# Patient Record
Sex: MALE | Race: BLACK OR AFRICAN AMERICAN | NOT HISPANIC OR LATINO | Employment: OTHER | URBAN - METROPOLITAN AREA
[De-identification: names, ages, dates, MRNs, and addresses within clinical notes are randomized per-mention and may not be internally consistent; named-entity substitution may affect disease eponyms.]

---

## 2017-01-13 ENCOUNTER — OFFICE VISIT (OUTPATIENT)
Dept: OPHTHALMOLOGY | Facility: CLINIC | Age: 72
End: 2017-01-13
Payer: MEDICARE

## 2017-01-13 DIAGNOSIS — Z96.1 PSEUDOPHAKIA OF BOTH EYES: ICD-10-CM

## 2017-01-13 DIAGNOSIS — H40.1132 PRIMARY OPEN ANGLE GLAUCOMA OF BOTH EYES, MODERATE STAGE: Primary | ICD-10-CM

## 2017-01-13 PROCEDURE — 99999 PR PBB SHADOW E&M-EST. PATIENT-LVL II: CPT | Mod: PBBFAC,,, | Performed by: OPHTHALMOLOGY

## 2017-01-13 PROCEDURE — 99212 OFFICE O/P EST SF 10 MIN: CPT | Mod: PBBFAC,PO | Performed by: OPHTHALMOLOGY

## 2017-01-13 PROCEDURE — 92012 INTRM OPH EXAM EST PATIENT: CPT | Mod: S$PBB,,, | Performed by: OPHTHALMOLOGY

## 2017-01-13 PROCEDURE — 92133 CPTRZD OPH DX IMG PST SGM ON: CPT | Mod: PBBFAC,PO | Performed by: OPHTHALMOLOGY

## 2017-01-13 NOTE — MR AVS SNAPSHOT
[facility]hthal[facility]  [address] [facility] [address] [place] [address]  Phone: [phone]  Fax: [phone]                  Derrick Cruz Jr.   2017 8:00 AM   Office Visit    Description:  Male : 1945   Provider:  Julio Cesar Michele MD   Department:  [facility] - [facility]           Reason for Visit     Glaucoma           Diagnoses this Visit        Comments    Primary open angle glaucoma of both eyes, moderate stage    -  Primary     Pseudophakia of both eyes                To Do List           Goals (5 Years of Data)     None      Follow-Up and Disposition     Return in about 3 months (around 2017).      [facility]     [facility] On [facility] Nurse Care Line -  Assistance  Registered nurses in the [facility] Call Center provide clinical advisement, health education, appointment booking, and other advisory services.  Call for this free service at [phone].             Medications           Message regarding Medications     Verify the changes and/or additions to your medication regime listed below are the same as discussed with your clinician today.  If any of these changes or additions are incorrect, please notify your healthcare provider.        STOP taking these medications     ketorolac 0.5% (ACULAR) 0.5 % Drop Place 1 drop into the right eye 4 (four) times daily. Eyedrops to start one day before surgery    prednisoLONE acetate (PRED FORTE) 1 % DrpS Place 1 drop into the right eye 4 (four) times daily. Start one day before surgery           Verify that the below list of medications is an accurate representation of the medications you are currently taking.  If none reported, the list may be blank. If incorrect, please contact your healthcare provider. Carry this list with you in case of emergency.           Current Medications     amlodipine-benazepril (LOTREL) 10-20 mg per capsule Take 1 capsule by mouth Every morning.    bimatoprost (LUMIGAN) 0.01 % Drop Place 1 drop into both  eyes every evening.    timolol maleate 0.5% (TIMOPTIC) 0.5 % Drop Place 1 drop into both eyes 2 (two) times daily.    ARGININE-PYROGLUTAMATE (L-ARGININE-L-PYROGLUTAM, BULK,) 100 % Marti     co-enzyme Q-10 30 mg capsule Take by mouth Use as needed.    magnesium 250 mg Tab Take 1 tablet by mouth Daily.           Clinical Reference Information           Allergies as of 1/13/2017     No Known Drug Allergies      Immunizations Administered on Date of Encounter - 1/13/2017     None      Orders Placed During Today's Visit      Normal Orders This Visit    Posterior Segment OCT Optic Nerve- Both eyes       [facility] Sign-Up     Activating your [facility] account is as easy as 1-2-3!     1) Visit [email or web address], select Sign Up Now, enter this activation code and your date of birth, then select Next.  [email or web address]  Expires: 2/27/2017  8:35 AM      2) Create a username and password to use when you visit [facility] in the future and select a security question in case you lose your password and select Next.    3) Enter your e-mail address and click Sign Up!    Additional Information  If you have questions, please e-mail [email or web address] or call [phone] to talk to our [facility] staff. Remember, [facility] is NOT to be used for urgent needs. For medical emergencies, dial 911.

## 2017-01-13 NOTE — PROGRESS NOTES
SUBJECTIVE:   Derrick Cruz Jr. is a 71 y.o. male   Uncorrected distance visual acuity was 20/20 in the right eye and 20/20 in the left eye.   Chief Complaint   Patient presents with    Glaucoma     3 month GOCT IOP chk; Timolol bid ou, Lumigan QHS OU        HPI:  HPI     Glaucoma    Additional comments: 3 month GOCT IOP chk; Timolol bid ou, Lumigan QHS OU             Comments   Pt states that there has been no change since his last appt. 95% compliant   with his gtts.     1. Mod. COAG (init 22/21) Goal <17  SLT OD 10/10/12 (19-18)  2. PCIOL / I-STENT OD 6-8-16   PCIOL OS w/iStent 9/30/15  3. Old BRVO OS    Timolol bid ou  Lumigan QHS OU       Last edited by Yung Schmidt, Patient Care Assistant on 1/13/2017  8:07   AM. (History)        Assessment /Plan :  1. Primary open angle glaucoma of both eyes, moderate stage Doing well, IOP within acceptable range relative to target IOP and no evidence of progression. Continue current treatment. Reviewed importance of continued compliance with treatment and follow up.     2. Pseudophakia of both eyes doing well     Return to clinic in 3-4 months  or as needed.  With 24-2 HVF, Dilation and SDP's

## 2017-05-19 ENCOUNTER — OFFICE VISIT (OUTPATIENT)
Dept: OPHTHALMOLOGY | Facility: CLINIC | Age: 72
End: 2017-05-19
Payer: MEDICARE

## 2017-05-19 ENCOUNTER — APPOINTMENT (OUTPATIENT)
Dept: OPHTHALMOLOGY | Facility: CLINIC | Age: 72
End: 2017-05-19
Payer: MEDICARE

## 2017-05-19 DIAGNOSIS — H34.8322 BRANCH RETINAL VEIN OCCLUSION OF LEFT EYE: ICD-10-CM

## 2017-05-19 DIAGNOSIS — Z96.1 PSEUDOPHAKIA OF BOTH EYES: ICD-10-CM

## 2017-05-19 DIAGNOSIS — H40.1132 PRIMARY OPEN ANGLE GLAUCOMA OF BOTH EYES, MODERATE STAGE: Primary | ICD-10-CM

## 2017-05-19 PROCEDURE — 99212 OFFICE O/P EST SF 10 MIN: CPT | Mod: PBBFAC,PO | Performed by: OPHTHALMOLOGY

## 2017-05-19 PROCEDURE — 92250 FUNDUS PHOTOGRAPHY W/I&R: CPT | Mod: PBBFAC,PO | Performed by: OPHTHALMOLOGY

## 2017-05-19 PROCEDURE — 92014 COMPRE OPH EXAM EST PT 1/>: CPT | Mod: S$PBB,,, | Performed by: OPHTHALMOLOGY

## 2017-05-19 PROCEDURE — 99999 PR PBB SHADOW E&M-EST. PATIENT-LVL II: CPT | Mod: PBBFAC,,, | Performed by: OPHTHALMOLOGY

## 2017-05-19 PROCEDURE — 92083 EXTENDED VISUAL FIELD XM: CPT | Mod: PBBFAC,PO | Performed by: OPHTHALMOLOGY

## 2017-05-19 NOTE — MR AVS SNAPSHOT
[facility] - [facility]  [address] [place] [address]  Phone: [phone]  Fax: [phone]                  Derrick Cruz Jr.   2017 8:00 AM   Office Visit    Description:  Male : 1945   Provider:  Julio Cesar Michele MD   Department:  [facility]           Reason for Visit     Glaucoma           Diagnoses this Visit        Comments    Primary open angle glaucoma of both eyes, moderate stage    -  Primary     Pseudophakia of both eyes         Branch retinal vein occlusion of left eye                To Do List           Goals (5 Years of Data)     None      Follow-Up and Disposition     Return in about 4 months (around 2017).      [facility]     [facility] On Call Nurse Care Line -  Assistance  Unless otherwise directed by your provider, please contact [facility], our nurse care line that is available for  assistance.     Registered nurses in the [facility] On Call Center provide: appointment scheduling, clinical advisement, health education, and other advisory services.  Call: [phone] (toll free)               Medications           Message regarding Medications     Verify the changes and/or additions to your medication regime listed below are the same as discussed with your clinician today.  If any of these changes or additions are incorrect, please notify your healthcare provider.             Verify that the below list of medications is an accurate representation of the medications you are currently taking.  If none reported, the list may be blank. If incorrect, please contact your healthcare provider. Carry this list with you in case of emergency.           Current Medications     amlodipine-benazepril (LOTREL) 10-20 mg per capsule Take 1 capsule by mouth Every morning.    ARGININE-PYROGLUTAMATE (L-ARGININE-L-PYROGLUTAM, BULK,) 100 % Marti     bimatoprost (LUMIGAN) 0.01 % Drop Place 1 drop into both eyes every evening.    co-enzyme Q-10 30 mg  capsule Take by mouth Use as needed.    magnesium 250 mg Tab Take 1 tablet by mouth Daily.    timolol maleate 0.5% (TIMOPTIC) 0.5 % Drop Place 1 drop into both eyes 2 (two) times daily.           Clinical Reference Information           Allergies as of 5/19/2017     No Known Drug Allergies      Immunizations Administered on Date of Encounter - 5/19/2017     None      Orders Placed During Today's Visit      Normal Orders This Visit    Color Fundus Photography - OU - Both Eyes     Avery Visual Field - OU - Extended - Both Eyes       [facility] Sign-Up     Activating your [facility] account is as easy as 1-2-3!     1) Visit [email or web address], select Sign Up Now, enter this activation code and your date of birth, then select Next.  [email or web address]  Expires: 7/3/2017  8:55 AM      2) Create a username and password to use when you visit [facility] in the future and select a security question in case you lose your password and select Next.    3) Enter your e-mail address and click Sign Up!    Additional Information  If you have questions, please e-mail [email or web address] or call [phone] to talk to our [facility] staff. Remember, [facility] is NOT to be used for urgent needs. For medical emergencies, dial 911.         Language Assistance Services     ATTENTION: Language assistance services are available, free of charge. Please call [phone].      ATENCIÓN: Si habla español, tiene a livingston disposición servicios gratuitos de asistencia lingüística. Llame al [phone].     CHÚ Ý: N?u b?n nói Ti?ng Vi?t, có các d?ch v? h? tr? ngôn ng? mi?n phí dành cho b?n. G?i s? [phone].         [facility] complies with applicable Federal civil rights laws and does not discriminate on the basis of race, color, national origin, age, disability, or sex.

## 2017-05-19 NOTE — PROGRESS NOTES
SUBJECTIVE:   Derrick Cruz Jr. is a 71 y.o. male   Uncorrected distance visual acuity was 20/20 in the right eye and 20/20 in the left eye.   Chief Complaint   Patient presents with    Glaucoma     4 month SDP HVF chk        HPI:  HPI     Glaucoma    Additional comments: 4 month SDP HVF chk           Comments   Pt states No pain or discomfort. Pt states that his right eye does get red   every now and then. Pt states he is 95% compliant with gtts.    1. Mod. COAG (init 22/21) Goal <17  SLT OD 10/10/12 (19-18)  2. PCIOL / I-STENT OD 6-8-16   PCIOL OS w/iStent 9/30/15  3. Old BRVO OS    Timolol bid ou  Lumigan QHS OU       Last edited by Yung Schmidt, Patient Care Assistant on 5/19/2017  8:13   AM. (History)        Assessment /Plan :  1. Primary open angle glaucoma of both eyes, moderate stage Doing well, IOP within acceptable range relative to target IOP and no evidence of progression. Continue current treatment. Reviewed importance of continued compliance with treatment and follow up.     2. Pseudophakia of both eyes doing well   3. Branch retinal vein occlusion of left eye stable     Return to clinic in 4 months  or as needed.  With IOP Check and GOCT

## 2017-09-28 ENCOUNTER — OFFICE VISIT (OUTPATIENT)
Dept: OPHTHALMOLOGY | Facility: CLINIC | Age: 72
End: 2017-09-28
Payer: MEDICARE

## 2017-09-28 DIAGNOSIS — Z96.1 PSEUDOPHAKIA OF BOTH EYES: ICD-10-CM

## 2017-09-28 DIAGNOSIS — H40.1132 PRIMARY OPEN ANGLE GLAUCOMA OF BOTH EYES, MODERATE STAGE: Primary | ICD-10-CM

## 2017-09-28 PROCEDURE — 99999 PR PBB SHADOW E&M-EST. PATIENT-LVL II: CPT | Mod: PBBFAC,,, | Performed by: OPHTHALMOLOGY

## 2017-09-28 PROCEDURE — 92133 CPTRZD OPH DX IMG PST SGM ON: CPT | Mod: PBBFAC,PO | Performed by: OPHTHALMOLOGY

## 2017-09-28 PROCEDURE — 92012 INTRM OPH EXAM EST PATIENT: CPT | Mod: S$PBB,,, | Performed by: OPHTHALMOLOGY

## 2017-09-28 PROCEDURE — 99212 OFFICE O/P EST SF 10 MIN: CPT | Mod: PBBFAC,PO | Performed by: OPHTHALMOLOGY

## 2017-09-28 NOTE — PROGRESS NOTES
SUBJECTIVE:   Derrick Cruz Jr. is a 71 y.o. male   Uncorrected distance visual acuity was 20/20 in the right eye and 20/25 +1 in the left eye.   Chief Complaint   Patient presents with    Glaucoma     Timolol BID % cx and Lumigan qhs OU 90% cx        HPI:  HPI     Glaucoma    Additional comments: Timolol BID % cx and Lumigan qhs OU 90% cx           Comments   4 month glaucoma check (IOP check and GOCT)    No new eye changes since patient's last visit   No eye pain    1. Mod. COAG (init 22/21) Goal <17  SLT OD 10/10/12 (19-18)  2. PCIOL / I-STENT OD 6-8-16   PCIOL OS w/iStent 9/30/15  3. Old BRVO OS    Timolol bid ou 100% cx  Lumigan QHS OU 90 % cx       Last edited by Ivonne Payne on 9/28/2017  7:55 AM. (History)        Assessment /Plan :  1. Primary open angle glaucoma of both eyes, moderate stage Doing well, IOP within acceptable range relative to target IOP and no evidence of progression. Continue current treatment. Reviewed importance of continued compliance with treatment and follow up.     2. Pseudophakia of both eyes stable       Return to clinic in 4 months  or as needed.  With IOP Check

## 2017-12-11 DIAGNOSIS — H40.1132 PRIMARY OPEN ANGLE GLAUCOMA OF BOTH EYES, MODERATE STAGE: ICD-10-CM

## 2017-12-11 RX ORDER — BIMATOPROST 0.1 MG/ML
SOLUTION/ DROPS OPHTHALMIC
Qty: 2.5 ML | Refills: 12 | Status: SHIPPED | OUTPATIENT
Start: 2017-12-11 | End: 2019-08-16

## 2017-12-11 RX ORDER — TIMOLOL MALEATE 5 MG/ML
SOLUTION/ DROPS OPHTHALMIC
Qty: 10 ML | Refills: 12 | Status: SHIPPED | OUTPATIENT
Start: 2017-12-11 | End: 2019-01-28 | Stop reason: SDUPTHER

## 2018-01-25 ENCOUNTER — OFFICE VISIT (OUTPATIENT)
Dept: OPHTHALMOLOGY | Facility: CLINIC | Age: 73
End: 2018-01-25
Payer: MEDICARE

## 2018-01-25 DIAGNOSIS — Z96.1 PSEUDOPHAKIA OF BOTH EYES: ICD-10-CM

## 2018-01-25 DIAGNOSIS — H40.1132 PRIMARY OPEN ANGLE GLAUCOMA OF BOTH EYES, MODERATE STAGE: Primary | ICD-10-CM

## 2018-01-25 PROCEDURE — 92012 INTRM OPH EXAM EST PATIENT: CPT | Mod: S$PBB,,, | Performed by: OPHTHALMOLOGY

## 2018-01-25 PROCEDURE — 99212 OFFICE O/P EST SF 10 MIN: CPT | Mod: PBBFAC,PO | Performed by: OPHTHALMOLOGY

## 2018-01-25 PROCEDURE — 99999 PR PBB SHADOW E&M-EST. PATIENT-LVL II: CPT | Mod: PBBFAC,,, | Performed by: OPHTHALMOLOGY

## 2018-01-25 NOTE — PROGRESS NOTES
SUBJECTIVE:   Derrick Cruz Jr. is a 72 y.o. male   Uncorrected distance visual acuity was 20/20 -1 in the right eye and 20/25 -2 in the left eye.   Chief Complaint   Patient presents with    Glaucoma     4 month IOP check         HPI:  HPI     Glaucoma    Additional comments: 4 month IOP check            Comments   95% compliance with gtts    1. Mod. COAG (init 22/21) Goal <17  SLT OD 10/10/12 (19-18)  2. PCIOL / I-STENT OD 6-8-16   PCIOL OS w/iStent 9/30/15  3. Old BRVO OS    Timolol bid ou  Lumigan QHS OU       Last edited by Susan Viramontes MA on 1/25/2018  8:26 AM. (History)        Assessment /Plan :  1. Primary open angle glaucoma of both eyes, moderate stage Doing well, IOP within acceptable range relative to target IOP and no evidence of progression. Continue current treatment. Reviewed importance of continued compliance with treatment and follow up.     2. Pseudophakia of both eyes  -- Condition stable, no therapeutic change required. Monitoring routinely.             Return to clinic in 4 months  or as needed.  With 24-2 HVF, Dilation and SDP's

## 2018-05-25 ENCOUNTER — APPOINTMENT (OUTPATIENT)
Dept: OPHTHALMOLOGY | Facility: CLINIC | Age: 73
End: 2018-05-25
Payer: MEDICARE

## 2018-05-25 ENCOUNTER — OFFICE VISIT (OUTPATIENT)
Dept: OPHTHALMOLOGY | Facility: CLINIC | Age: 73
End: 2018-05-25
Payer: MEDICARE

## 2018-05-25 DIAGNOSIS — Z96.1 PSEUDOPHAKIA OF BOTH EYES: ICD-10-CM

## 2018-05-25 DIAGNOSIS — H40.1132 PRIMARY OPEN ANGLE GLAUCOMA OF BOTH EYES, MODERATE STAGE: Primary | ICD-10-CM

## 2018-05-25 DIAGNOSIS — H34.8322 STABLE BRANCH RETINAL VEIN OCCLUSION OF LEFT EYE: ICD-10-CM

## 2018-05-25 PROCEDURE — 92014 COMPRE OPH EXAM EST PT 1/>: CPT | Mod: S$PBB,,, | Performed by: OPHTHALMOLOGY

## 2018-05-25 PROCEDURE — 99999 PR PBB SHADOW E&M-EST. PATIENT-LVL II: CPT | Mod: PBBFAC,,, | Performed by: OPHTHALMOLOGY

## 2018-05-25 PROCEDURE — 99212 OFFICE O/P EST SF 10 MIN: CPT | Mod: PBBFAC,PO | Performed by: OPHTHALMOLOGY

## 2018-05-25 PROCEDURE — 92083 EXTENDED VISUAL FIELD XM: CPT | Mod: PBBFAC,PO | Performed by: OPHTHALMOLOGY

## 2018-05-25 PROCEDURE — 92250 FUNDUS PHOTOGRAPHY W/I&R: CPT | Mod: PBBFAC,PO | Performed by: OPHTHALMOLOGY

## 2018-05-25 NOTE — PROGRESS NOTES
SUBJECTIVE:   Derrick Cruz Jr. is a 72 y.o. male   Uncorrected distance visual acuity was 20/20 in the right eye and 20/25 +1 in the left eye.   Chief Complaint   Patient presents with    Glaucoma     here for 4 month dilation pt states not using lumigan bc insurance company said they dont recommend         HPI:  HPI     Glaucoma    Additional comments: here for 4 month dilation pt states not using   lumigan bc insurance company said they dont recommend            Comments   1. Mod. COAG (init 22/21) Goal <17  SLT OD 10/10/12 (19-18)  2. PCIOL / I-STENT OD 6-8-16   PCIOL OS w/iStent 9/30/15  3. Old BRVO OS    Timolol bid ou  Lumigan QHS OU       Last edited by Manisha Duque MA on 5/25/2018  8:29 AM. (History)        Assessment /Plan :  1. Primary open angle glaucoma of both eyes, moderate stage Doing well, IOP within acceptable range relative to target IOP and no evidence of progression. Continue current treatment. Reviewed importance of continued compliance with treatment and follow up.  Continue Timolol OU BID  Discontinue Lumigan for now   2. Pseudophakia of both eyes  -- Condition stable, no therapeutic change required. Monitoring routinely.     3. Stable branch retinal vein occlusion of left eye  -- Condition stable, no therapeutic change required. Monitoring routinely.       Return to clinic in 3-4 months  or as needed.  With IOP Check and GOCT

## 2018-11-16 ENCOUNTER — OFFICE VISIT (OUTPATIENT)
Dept: OPHTHALMOLOGY | Facility: CLINIC | Age: 73
End: 2018-11-16
Payer: MEDICARE

## 2018-11-16 DIAGNOSIS — Z96.1 PSEUDOPHAKIA OF BOTH EYES: ICD-10-CM

## 2018-11-16 DIAGNOSIS — H40.1132 PRIMARY OPEN ANGLE GLAUCOMA OF BOTH EYES, MODERATE STAGE: Primary | ICD-10-CM

## 2018-11-16 PROCEDURE — 92012 INTRM OPH EXAM EST PATIENT: CPT | Mod: S$PBB,,, | Performed by: OPHTHALMOLOGY

## 2018-11-16 PROCEDURE — 99999 PR PBB SHADOW E&M-EST. PATIENT-LVL II: CPT | Mod: PBBFAC,,, | Performed by: OPHTHALMOLOGY

## 2018-11-16 PROCEDURE — 92133 CPTRZD OPH DX IMG PST SGM ON: CPT | Mod: PBBFAC,PO | Performed by: OPHTHALMOLOGY

## 2018-11-16 PROCEDURE — 99212 OFFICE O/P EST SF 10 MIN: CPT | Mod: PBBFAC,PO | Performed by: OPHTHALMOLOGY

## 2018-11-16 NOTE — PROGRESS NOTES
SUBJECTIVE:   Derrick Cruz Jr. is a 73 y.o. male   Uncorrected distance visual acuity was 20/20 in the right eye and 20/25 -2 in the left eye.   Chief Complaint   Patient presents with    Glaucoma        HPI:  HPI     Pt here for 4m GOCT IOP chk. No pain or discomfort. VA stable. 95%   compliant with gtts.     1. Mod. COAG (init 22/21) Goal <17  SLT OD 10/10/12 (19-18)  2. PCIOL / I-STENT OD 6-8-16   PCIOL OS w/iStent 9/30/15  3. Old BRVO OS    Timolol bid ou  Lumigan QHS OU-not using    Last edited by Yung Schmidt, Patient Care Assistant on 11/16/2018  8:10   AM. (History)        Assessment /Plan :  1. Primary open angle glaucoma of both eyes, moderate stage Doing well, IOP within acceptable range relative to target IOP and no evidence of progression. Continue current treatment. Reviewed importance of continued compliance with treatment and follow up.     2. Pseudophakia of both eyes  -- Condition stable, no therapeutic change required. Monitoring routinely.       Continue: Timolol OU BID    Return to clinic in 3 months  or as needed.  With IOP Check

## 2019-01-28 DIAGNOSIS — H40.1132 PRIMARY OPEN ANGLE GLAUCOMA OF BOTH EYES, MODERATE STAGE: ICD-10-CM

## 2019-01-29 RX ORDER — TIMOLOL MALEATE 5 MG/ML
SOLUTION/ DROPS OPHTHALMIC
Qty: 10 ML | Refills: 12 | Status: SHIPPED | OUTPATIENT
Start: 2019-01-29 | End: 2020-01-30

## 2019-02-22 ENCOUNTER — OFFICE VISIT (OUTPATIENT)
Dept: OPHTHALMOLOGY | Facility: CLINIC | Age: 74
End: 2019-02-22
Payer: MEDICARE

## 2019-02-22 DIAGNOSIS — H40.1132 PRIMARY OPEN ANGLE GLAUCOMA OF BOTH EYES, MODERATE STAGE: Primary | ICD-10-CM

## 2019-02-22 DIAGNOSIS — Z96.1 PSEUDOPHAKIA OF BOTH EYES: ICD-10-CM

## 2019-02-22 PROCEDURE — 92012 PR EYE EXAM, EST PATIENT,INTERMED: ICD-10-PCS | Mod: S$PBB,,, | Performed by: OPHTHALMOLOGY

## 2019-02-22 PROCEDURE — 99212 OFFICE O/P EST SF 10 MIN: CPT | Mod: PBBFAC,PN | Performed by: OPHTHALMOLOGY

## 2019-02-22 PROCEDURE — 92012 INTRM OPH EXAM EST PATIENT: CPT | Mod: S$PBB,,, | Performed by: OPHTHALMOLOGY

## 2019-02-22 PROCEDURE — 99999 PR PBB SHADOW E&M-EST. PATIENT-LVL II: CPT | Mod: PBBFAC,,, | Performed by: OPHTHALMOLOGY

## 2019-02-22 PROCEDURE — 99999 PR PBB SHADOW E&M-EST. PATIENT-LVL II: ICD-10-PCS | Mod: PBBFAC,,, | Performed by: OPHTHALMOLOGY

## 2019-02-22 NOTE — PROGRESS NOTES
SUBJECTIVE:   Derrick Cruz Jr. is a 73 y.o. male   Uncorrected distance visual acuity was 20/20 in the right eye and 20/20 -2 in the left eye.   Chief Complaint   Patient presents with    Glaucoma        HPI:  HPI     Pt here for 3m IOP chk. No pain or discomfort. VA stable. 100% compliant   with gtts.     1. Mod. COAG (init 22/21) Goal <17  SLT OD 10/10/12 (19-18)  Lumigan ( too expensive )  2. PCIOL / I-STENT OD 6-8-16   PCIOL OS w/iStent 9/30/15  3. Old BRVO OS    Timolol bid ou    Last edited by Yung Schmidt, Patient Care Assistant on 2/22/2019  8:47   AM. (History)        Assessment /Plan :  1. Primary open angle glaucoma of both eyes, moderate stage Doing well, IOP within acceptable range relative to target IOP and no evidence of progression. Continue current treatment. Reviewed importance of continued compliance with treatment and follow up.     2. Pseudophakia of both eyes  -- Condition stable, no therapeutic change required. Monitoring routinely.       Return to clinic in 3 months  or as needed.  With Dilation, HVF 24-2 and SDP

## 2019-05-13 ENCOUNTER — OFFICE VISIT (OUTPATIENT)
Dept: OPHTHALMOLOGY | Facility: CLINIC | Age: 74
End: 2019-05-13
Payer: MEDICARE

## 2019-05-13 DIAGNOSIS — Z96.1 PSEUDOPHAKIA OF BOTH EYES: ICD-10-CM

## 2019-05-13 DIAGNOSIS — H40.1132 PRIMARY OPEN ANGLE GLAUCOMA OF BOTH EYES, MODERATE STAGE: Primary | ICD-10-CM

## 2019-05-13 DIAGNOSIS — H34.8322 STABLE BRANCH RETINAL VEIN OCCLUSION OF LEFT EYE: ICD-10-CM

## 2019-05-13 PROCEDURE — 92250 FUNDUS PHOTOGRAPHY W/I&R: CPT | Mod: PBBFAC,PN | Performed by: OPHTHALMOLOGY

## 2019-05-13 PROCEDURE — 92083 HUMPHREY VISUAL FIELD - OU - BOTH EYES: ICD-10-PCS | Mod: 26,S$PBB,, | Performed by: OPHTHALMOLOGY

## 2019-05-13 PROCEDURE — 99999 PR PBB SHADOW E&M-EST. PATIENT-LVL II: ICD-10-PCS | Mod: PBBFAC,,, | Performed by: OPHTHALMOLOGY

## 2019-05-13 PROCEDURE — 92014 COMPRE OPH EXAM EST PT 1/>: CPT | Mod: S$PBB,,, | Performed by: OPHTHALMOLOGY

## 2019-05-13 PROCEDURE — 99212 OFFICE O/P EST SF 10 MIN: CPT | Mod: PBBFAC,25 | Performed by: OPHTHALMOLOGY

## 2019-05-13 PROCEDURE — 92083 EXTENDED VISUAL FIELD XM: CPT | Mod: PBBFAC,PN | Performed by: OPHTHALMOLOGY

## 2019-05-13 PROCEDURE — 99999 PR PBB SHADOW E&M-EST. PATIENT-LVL II: CPT | Mod: PBBFAC,,, | Performed by: OPHTHALMOLOGY

## 2019-05-13 PROCEDURE — 92250 COLOR FUNDUS PHOTOGRAPHY - OU - BOTH EYES: ICD-10-PCS | Mod: 26,S$PBB,, | Performed by: OPHTHALMOLOGY

## 2019-05-13 PROCEDURE — 92014 PR EYE EXAM, EST PATIENT,COMPREHESV: ICD-10-PCS | Mod: S$PBB,,, | Performed by: OPHTHALMOLOGY

## 2019-05-13 NOTE — PROGRESS NOTES
SUBJECTIVE:   Derrick Cruz Jr. is a 73 y.o. male   Uncorrected distance visual acuity was 20/20 in the right eye and 20/20 in the left eye.   Chief Complaint   Patient presents with    Glaucoma        HPI:  HPI     Pt here for 3m  HVF SDP chk. No pain or discomfort. VA stable. 100%   compliant with gtts.     1. Mod. COAG (init 22/21) Goal <17  SLT OD 10/10/12 (19-18)  Lumigan ( too expensive )  2. PCIOL / I-STENT OD 6-8-16   PCIOL OS w/iStent 9/30/15  3. Old BRVO OS    Timolol bid ou    Last edited by Yung Schmidt, Patient Care Assistant on 5/13/2019 10:09   AM. (History)        Assessment /Plan :  1. Primary open angle glaucoma of both eyes, moderate stage Doing well, IOP within acceptable range relative to target IOP and no evidence of progression. Continue current treatment. Reviewed importance of continued compliance with treatment and follow up.     2. Pseudophakia of both eyes  -- Condition stable, no therapeutic change required. Monitoring routinely.     3. Stable branch retinal vein occlusion of left eye        Return to clinic in 3 months  or as needed.  With IOP Check and GOCT

## 2019-08-16 ENCOUNTER — OFFICE VISIT (OUTPATIENT)
Dept: OPHTHALMOLOGY | Facility: CLINIC | Age: 74
End: 2019-08-16
Payer: MEDICARE

## 2019-08-16 DIAGNOSIS — Z96.1 PSEUDOPHAKIA OF BOTH EYES: ICD-10-CM

## 2019-08-16 DIAGNOSIS — H40.1132 PRIMARY OPEN ANGLE GLAUCOMA OF BOTH EYES, MODERATE STAGE: Primary | ICD-10-CM

## 2019-08-16 PROCEDURE — 92133 POSTERIOR SEGMENT OCT OPTIC NERVE(OCULAR COHERENCE TOMOGRAPHY) - OU - BOTH EYES: ICD-10-PCS | Mod: 26,S$PBB,, | Performed by: OPHTHALMOLOGY

## 2019-08-16 PROCEDURE — 92012 PR EYE EXAM, EST PATIENT,INTERMED: ICD-10-PCS | Mod: S$PBB,,, | Performed by: OPHTHALMOLOGY

## 2019-08-16 PROCEDURE — 92133 CPTRZD OPH DX IMG PST SGM ON: CPT | Mod: PBBFAC | Performed by: OPHTHALMOLOGY

## 2019-08-16 PROCEDURE — 99999 PR PBB SHADOW E&M-EST. PATIENT-LVL II: CPT | Mod: PBBFAC,,, | Performed by: OPHTHALMOLOGY

## 2019-08-16 PROCEDURE — 99999 PR PBB SHADOW E&M-EST. PATIENT-LVL II: ICD-10-PCS | Mod: PBBFAC,,, | Performed by: OPHTHALMOLOGY

## 2019-08-16 PROCEDURE — 99212 OFFICE O/P EST SF 10 MIN: CPT | Mod: PBBFAC,25 | Performed by: OPHTHALMOLOGY

## 2019-08-16 PROCEDURE — 92012 INTRM OPH EXAM EST PATIENT: CPT | Mod: S$PBB,,, | Performed by: OPHTHALMOLOGY

## 2019-08-16 NOTE — PROGRESS NOTES
SUBJECTIVE:   Derrick Cruz Jr. is a 73 y.o. male   Uncorrected distance visual acuity was 20/20 in the right eye and 20/20 in the left eye.   Chief Complaint   Patient presents with    Glaucoma     3 month IOP check        HPI:  HPI     Glaucoma      Additional comments: 3 month IOP check              Comments     3 month IOP check with GOCT  No changes noted in VA  No pain or discomfort  100% compliant with Timolol BID    1. Mod. COAG (init 22/21) Goal <17  SLT OD 10/10/12 (19-18)  Lumigan ( too expensive )  2. PCIOL / I-STENT OD 6-8-16   PCIOL OS w/iStent 9/30/15  3. Old BRVO OS    Timolol bid ou          Last edited by Мария Guillen on 8/16/2019  8:22 AM. (History)        Assessment /Plan :  1. Primary open angle glaucoma of both eyes, moderate stage Doing well, IOP within acceptable range relative to target IOP and no evidence of progression. Continue current treatment. Reviewed importance of continued compliance with treatment and follow up.     2. Pseudophakia of both eyes  -- Condition stable, no therapeutic change required. Monitoring routinely.       Return to clinic in 3 months  or as needed.  With IOP Check

## 2019-11-15 ENCOUNTER — OFFICE VISIT (OUTPATIENT)
Dept: OPHTHALMOLOGY | Facility: CLINIC | Age: 74
End: 2019-11-15
Payer: MEDICARE

## 2019-11-15 DIAGNOSIS — Z96.1 PSEUDOPHAKIA OF BOTH EYES: ICD-10-CM

## 2019-11-15 DIAGNOSIS — H40.1132 PRIMARY OPEN ANGLE GLAUCOMA OF BOTH EYES, MODERATE STAGE: Primary | ICD-10-CM

## 2019-11-15 PROCEDURE — 92012 PR EYE EXAM, EST PATIENT,INTERMED: ICD-10-PCS | Mod: S$PBB,,, | Performed by: OPHTHALMOLOGY

## 2019-11-15 PROCEDURE — 99999 PR PBB SHADOW E&M-EST. PATIENT-LVL II: ICD-10-PCS | Mod: PBBFAC,,, | Performed by: OPHTHALMOLOGY

## 2019-11-15 PROCEDURE — 99212 OFFICE O/P EST SF 10 MIN: CPT | Mod: PBBFAC | Performed by: OPHTHALMOLOGY

## 2019-11-15 PROCEDURE — 99999 PR PBB SHADOW E&M-EST. PATIENT-LVL II: CPT | Mod: PBBFAC,,, | Performed by: OPHTHALMOLOGY

## 2019-11-15 PROCEDURE — 92012 INTRM OPH EXAM EST PATIENT: CPT | Mod: S$PBB,,, | Performed by: OPHTHALMOLOGY

## 2019-11-15 NOTE — PROGRESS NOTES
SUBJECTIVE:   Derrikc Cruz Jr. is a 74 y.o. male   Uncorrected distance visual acuity was 20/20 in the right eye and 20/20 in the left eye.   Chief Complaint   Patient presents with    Glaucoma        HPI:  HPI     Here for iop check no changes per pt    1. Mod. COAG (init 22/21) Goal <17  SLT OD 10/10/12 (19-18)  Lumigan ( too expensive )  2. PCIOL / I-STENT OD 6-8-16   PCIOL OS w/iStent 9/30/15  3. Old BRVO OS    Timolol bid ou    Last edited by Manisha Duque MA on 11/15/2019  8:26 AM. (History)        Assessment /Plan :  1. Primary open angle glaucoma of both eyes, moderate stage Doing well, IOP within acceptable range relative to target IOP and no evidence of progression. Continue current treatment. Reviewed importance of continued compliance with treatment and follow up.     2. Pseudophakia of both eyes  -- Condition stable, no therapeutic change required. Monitoring routinely.     Return to clinic in 3 months  or as needed.  With IOP Check and GOCT

## 2020-01-30 DIAGNOSIS — H40.1132 PRIMARY OPEN ANGLE GLAUCOMA OF BOTH EYES, MODERATE STAGE: ICD-10-CM

## 2020-01-30 RX ORDER — TIMOLOL MALEATE 5 MG/ML
SOLUTION/ DROPS OPHTHALMIC
Qty: 10 ML | Refills: 12 | Status: SHIPPED | OUTPATIENT
Start: 2020-01-30 | End: 2021-02-19

## 2020-03-02 ENCOUNTER — OFFICE VISIT (OUTPATIENT)
Dept: OPHTHALMOLOGY | Facility: CLINIC | Age: 75
End: 2020-03-02
Payer: MEDICARE

## 2020-03-02 DIAGNOSIS — Z96.1 PSEUDOPHAKIA OF BOTH EYES: ICD-10-CM

## 2020-03-02 DIAGNOSIS — H34.8322 STABLE BRANCH RETINAL VEIN OCCLUSION OF LEFT EYE: ICD-10-CM

## 2020-03-02 DIAGNOSIS — H40.1132 PRIMARY OPEN ANGLE GLAUCOMA OF BOTH EYES, MODERATE STAGE: Primary | ICD-10-CM

## 2020-03-02 PROCEDURE — 92012 PR EYE EXAM, EST PATIENT,INTERMED: ICD-10-PCS | Mod: S$PBB,,, | Performed by: OPHTHALMOLOGY

## 2020-03-02 PROCEDURE — 99212 OFFICE O/P EST SF 10 MIN: CPT | Mod: PBBFAC | Performed by: OPHTHALMOLOGY

## 2020-03-02 PROCEDURE — 92012 INTRM OPH EXAM EST PATIENT: CPT | Mod: S$PBB,,, | Performed by: OPHTHALMOLOGY

## 2020-03-02 PROCEDURE — 92133 CPTRZD OPH DX IMG PST SGM ON: CPT | Mod: PBBFAC | Performed by: OPHTHALMOLOGY

## 2020-03-02 PROCEDURE — 99999 PR PBB SHADOW E&M-EST. PATIENT-LVL II: ICD-10-PCS | Mod: PBBFAC,,, | Performed by: OPHTHALMOLOGY

## 2020-03-02 PROCEDURE — 92133 POSTERIOR SEGMENT OCT OPTIC NERVE(OCULAR COHERENCE TOMOGRAPHY) - OU - BOTH EYES: ICD-10-PCS | Mod: 26,S$PBB,, | Performed by: OPHTHALMOLOGY

## 2020-03-02 PROCEDURE — 99999 PR PBB SHADOW E&M-EST. PATIENT-LVL II: CPT | Mod: PBBFAC,,, | Performed by: OPHTHALMOLOGY

## 2020-03-02 NOTE — PROGRESS NOTES
SUBJECTIVE  Derrick Cruz Jr. is 74 y.o. male  Uncorrected distance visual acuity was 20/20 in the right eye and 20/20 in the left eye.   Chief Complaint   Patient presents with    Glaucoma          HPI     3 months glaucoma check (IOP check and GOCT)    No eye pain and no new eye changes since patient's last visit    1. Mod. COAG (init 22/21) Goal <17  SLT OD 10/10/12 (19-18)  Lumigan ( too expensive )  2. PCIOL / I-STENT OD 6-8-16   PCIOL OS w/iStent 9/30/15  3. Old BRVO OS    Timolol bid ou    Last edited by Ivonne Payne on 3/2/2020  7:57 AM. (History)         Assessment /Plan :  1. Primary open angle glaucoma of both eyes, moderate stage Doing well, IOP within acceptable range relative to target IOP and no evidence of progression. Continue current treatment. Reviewed importance of continued compliance with treatment and follow up.     2. Pseudophakia of both eyes  -- Condition stable, no therapeutic change required. Monitoring routinely.     3. Stable branch retinal vein occlusion of left eye    Return to clinic in 3 months  or as needed.  With Dilation, HVF 24-2 and SDP

## 2020-12-17 ENCOUNTER — OFFICE VISIT (OUTPATIENT)
Dept: OPHTHALMOLOGY | Facility: CLINIC | Age: 75
End: 2020-12-17
Payer: MEDICARE

## 2020-12-17 ENCOUNTER — APPOINTMENT (OUTPATIENT)
Dept: OPHTHALMOLOGY | Facility: CLINIC | Age: 75
End: 2020-12-17
Payer: MEDICARE

## 2020-12-17 DIAGNOSIS — Z96.1 PSEUDOPHAKIA OF BOTH EYES: ICD-10-CM

## 2020-12-17 DIAGNOSIS — H34.8322 STABLE BRANCH RETINAL VEIN OCCLUSION OF LEFT EYE: ICD-10-CM

## 2020-12-17 DIAGNOSIS — H40.1132 PRIMARY OPEN ANGLE GLAUCOMA OF BOTH EYES, MODERATE STAGE: Primary | ICD-10-CM

## 2020-12-17 PROCEDURE — 92014 PR EYE EXAM, EST PATIENT,COMPREHESV: ICD-10-PCS | Mod: S$PBB,,, | Performed by: OPHTHALMOLOGY

## 2020-12-17 PROCEDURE — 99212 OFFICE O/P EST SF 10 MIN: CPT | Mod: PBBFAC,25 | Performed by: OPHTHALMOLOGY

## 2020-12-17 PROCEDURE — 92133 CPTRZD OPH DX IMG PST SGM ON: CPT | Mod: PBBFAC | Performed by: OPHTHALMOLOGY

## 2020-12-17 PROCEDURE — 99999 PR PBB SHADOW E&M-EST. PATIENT-LVL II: CPT | Mod: PBBFAC,,, | Performed by: OPHTHALMOLOGY

## 2020-12-17 PROCEDURE — 92083 HUMPHREY VISUAL FIELD - OU - BOTH EYES: ICD-10-PCS | Mod: 26,S$PBB,, | Performed by: OPHTHALMOLOGY

## 2020-12-17 PROCEDURE — 92083 EXTENDED VISUAL FIELD XM: CPT | Mod: PBBFAC | Performed by: OPHTHALMOLOGY

## 2020-12-17 PROCEDURE — 99999 PR PBB SHADOW E&M-EST. PATIENT-LVL II: ICD-10-PCS | Mod: PBBFAC,,, | Performed by: OPHTHALMOLOGY

## 2020-12-17 PROCEDURE — 92014 COMPRE OPH EXAM EST PT 1/>: CPT | Mod: S$PBB,,, | Performed by: OPHTHALMOLOGY

## 2020-12-17 PROCEDURE — 92133 POSTERIOR SEGMENT OCT OPTIC NERVE(OCULAR COHERENCE TOMOGRAPHY) - OU - BOTH EYES: ICD-10-PCS | Mod: 26,S$PBB,, | Performed by: OPHTHALMOLOGY

## 2020-12-17 NOTE — PROGRESS NOTES
SUBJECTIVE  Derrick Cruz Jr. is 75 y.o. male  Uncorrected distance visual acuity was 20/20 in the right eye and 20/20 in the left eye.   Chief Complaint   Patient presents with    Glaucoma     3 months for dilation, hvf 24-2, sdp          HPI     Glaucoma      Additional comments: 3 months for dilation, hvf 24-2, sdp              Comments     Patient states no visual complaints and no ocular pain/discomfort. 100%   drop compliance     1. Mod. COAG (init 22/21) Goal <17  SLT OD 10/10/12 (19-18)  Lumigan ( too expensive )  2. PCIOL / I-STENT OD 6-8-16   PCIOL OS w/iStent 9/30/15  3. Old BRVO OS    Timolol bid ou          Last edited by Carmen Graff on 12/17/2020  1:36 PM. (History)         Assessment /Plan :  1. Primary open angle glaucoma of both eyes, moderate stage Doing well, IOP within acceptable range relative to target IOP and no evidence of progression. Continue current treatment. Reviewed importance of continued compliance with treatment and follow up.     2. Pseudophakia of both eyes -stable    3. Stable branch retinal vein occlusion of left eye      Return to clinic in 4 months  or as needed.  With IOP Check

## 2021-04-16 ENCOUNTER — OFFICE VISIT (OUTPATIENT)
Dept: OPHTHALMOLOGY | Facility: CLINIC | Age: 76
End: 2021-04-16
Payer: MEDICARE

## 2021-04-16 DIAGNOSIS — Z96.1 PSEUDOPHAKIA OF BOTH EYES: ICD-10-CM

## 2021-04-16 DIAGNOSIS — H40.1132 PRIMARY OPEN ANGLE GLAUCOMA OF BOTH EYES, MODERATE STAGE: Primary | ICD-10-CM

## 2021-04-16 DIAGNOSIS — H43.811 POSTERIOR VITREOUS DETACHMENT OF RIGHT EYE: ICD-10-CM

## 2021-04-16 DIAGNOSIS — H34.8322 STABLE BRANCH RETINAL VEIN OCCLUSION OF LEFT EYE: ICD-10-CM

## 2021-04-16 PROCEDURE — 92250 FUNDUS PHOTOGRAPHY W/I&R: CPT | Mod: PBBFAC | Performed by: OPHTHALMOLOGY

## 2021-04-16 PROCEDURE — 99212 OFFICE O/P EST SF 10 MIN: CPT | Mod: PBBFAC | Performed by: OPHTHALMOLOGY

## 2021-04-16 PROCEDURE — 92250 COLOR FUNDUS PHOTOGRAPHY - OU - BOTH EYES: ICD-10-PCS | Mod: 26,S$PBB,, | Performed by: OPHTHALMOLOGY

## 2021-04-16 PROCEDURE — 99214 OFFICE O/P EST MOD 30 MIN: CPT | Mod: S$PBB,,, | Performed by: OPHTHALMOLOGY

## 2021-04-16 PROCEDURE — 99999 PR PBB SHADOW E&M-EST. PATIENT-LVL II: ICD-10-PCS | Mod: PBBFAC,,, | Performed by: OPHTHALMOLOGY

## 2021-04-16 PROCEDURE — 99999 PR PBB SHADOW E&M-EST. PATIENT-LVL II: CPT | Mod: PBBFAC,,, | Performed by: OPHTHALMOLOGY

## 2021-04-16 PROCEDURE — 99214 PR OFFICE/OUTPT VISIT, EST, LEVL IV, 30-39 MIN: ICD-10-PCS | Mod: S$PBB,,, | Performed by: OPHTHALMOLOGY

## 2021-07-23 ENCOUNTER — OFFICE VISIT (OUTPATIENT)
Dept: OPHTHALMOLOGY | Facility: CLINIC | Age: 76
End: 2021-07-23
Payer: MEDICARE

## 2021-07-23 DIAGNOSIS — H34.8322 STABLE BRANCH RETINAL VEIN OCCLUSION OF LEFT EYE: ICD-10-CM

## 2021-07-23 DIAGNOSIS — Z96.1 PSEUDOPHAKIA OF BOTH EYES: ICD-10-CM

## 2021-07-23 DIAGNOSIS — H40.1132 PRIMARY OPEN ANGLE GLAUCOMA OF BOTH EYES, MODERATE STAGE: Primary | ICD-10-CM

## 2021-07-23 PROCEDURE — 99999 PR PBB SHADOW E&M-EST. PATIENT-LVL II: CPT | Mod: PBBFAC,,, | Performed by: OPHTHALMOLOGY

## 2021-07-23 PROCEDURE — 92133 CPTRZD OPH DX IMG PST SGM ON: CPT | Mod: PBBFAC | Performed by: OPHTHALMOLOGY

## 2021-07-23 PROCEDURE — 99212 OFFICE O/P EST SF 10 MIN: CPT | Mod: PBBFAC | Performed by: OPHTHALMOLOGY

## 2021-07-23 PROCEDURE — 99999 PR PBB SHADOW E&M-EST. PATIENT-LVL II: ICD-10-PCS | Mod: PBBFAC,,, | Performed by: OPHTHALMOLOGY

## 2021-07-23 PROCEDURE — 99214 PR OFFICE/OUTPT VISIT, EST, LEVL IV, 30-39 MIN: ICD-10-PCS | Mod: S$PBB,,, | Performed by: OPHTHALMOLOGY

## 2021-07-23 PROCEDURE — 92133 POSTERIOR SEGMENT OCT OPTIC NERVE(OCULAR COHERENCE TOMOGRAPHY) - OU - BOTH EYES: ICD-10-PCS | Mod: 26,S$PBB,, | Performed by: OPHTHALMOLOGY

## 2021-07-23 PROCEDURE — 99214 OFFICE O/P EST MOD 30 MIN: CPT | Mod: S$PBB,,, | Performed by: OPHTHALMOLOGY

## 2021-07-23 RX ORDER — PERMETHRIN 50 MG/G
1 CREAM TOPICAL
COMMUNITY
Start: 2021-06-25

## 2021-07-23 RX ORDER — TRIAMCINOLONE ACETONIDE 1 MG/G
OINTMENT TOPICAL 2 TIMES DAILY
COMMUNITY
Start: 2021-06-21

## 2021-10-29 ENCOUNTER — OFFICE VISIT (OUTPATIENT)
Dept: OPHTHALMOLOGY | Facility: CLINIC | Age: 76
End: 2021-10-29
Payer: MEDICARE

## 2021-10-29 DIAGNOSIS — H40.1132 PRIMARY OPEN ANGLE GLAUCOMA OF BOTH EYES, MODERATE STAGE: Primary | ICD-10-CM

## 2021-10-29 DIAGNOSIS — H34.8322 STABLE BRANCH RETINAL VEIN OCCLUSION OF LEFT EYE: ICD-10-CM

## 2021-10-29 DIAGNOSIS — Z96.1 PSEUDOPHAKIA OF BOTH EYES: ICD-10-CM

## 2021-10-29 PROCEDURE — 99214 OFFICE O/P EST MOD 30 MIN: CPT | Mod: S$PBB,,, | Performed by: OPHTHALMOLOGY

## 2021-10-29 PROCEDURE — 99212 OFFICE O/P EST SF 10 MIN: CPT | Mod: PBBFAC | Performed by: OPHTHALMOLOGY

## 2021-10-29 PROCEDURE — 99214 PR OFFICE/OUTPT VISIT, EST, LEVL IV, 30-39 MIN: ICD-10-PCS | Mod: S$PBB,,, | Performed by: OPHTHALMOLOGY

## 2021-10-29 PROCEDURE — 99999 PR PBB SHADOW E&M-EST. PATIENT-LVL II: ICD-10-PCS | Mod: PBBFAC,,, | Performed by: OPHTHALMOLOGY

## 2021-10-29 PROCEDURE — 99999 PR PBB SHADOW E&M-EST. PATIENT-LVL II: CPT | Mod: PBBFAC,,, | Performed by: OPHTHALMOLOGY

## 2022-02-17 ENCOUNTER — OFFICE VISIT (OUTPATIENT)
Dept: OPHTHALMOLOGY | Facility: CLINIC | Age: 77
End: 2022-02-17
Payer: MEDICARE

## 2022-02-17 ENCOUNTER — APPOINTMENT (OUTPATIENT)
Dept: OPHTHALMOLOGY | Facility: CLINIC | Age: 77
End: 2022-02-17
Payer: MEDICARE

## 2022-02-17 DIAGNOSIS — Z96.1 PSEUDOPHAKIA OF BOTH EYES: ICD-10-CM

## 2022-02-17 DIAGNOSIS — H40.1132 PRIMARY OPEN ANGLE GLAUCOMA OF BOTH EYES, MODERATE STAGE: Primary | ICD-10-CM

## 2022-02-17 PROCEDURE — 99999 PR PBB SHADOW E&M-EST. PATIENT-LVL II: CPT | Mod: PBBFAC,,, | Performed by: OPHTHALMOLOGY

## 2022-02-17 PROCEDURE — 92133 CPTRZD OPH DX IMG PST SGM ON: CPT | Mod: PBBFAC | Performed by: OPHTHALMOLOGY

## 2022-02-17 PROCEDURE — 99214 PR OFFICE/OUTPT VISIT, EST, LEVL IV, 30-39 MIN: ICD-10-PCS | Mod: S$PBB,,, | Performed by: OPHTHALMOLOGY

## 2022-02-17 PROCEDURE — 92083 HUMPHREY VISUAL FIELD - OU - BOTH EYES: ICD-10-PCS | Mod: 26,S$PBB,, | Performed by: OPHTHALMOLOGY

## 2022-02-17 PROCEDURE — 99999 PR PBB SHADOW E&M-EST. PATIENT-LVL II: ICD-10-PCS | Mod: PBBFAC,,, | Performed by: OPHTHALMOLOGY

## 2022-02-17 PROCEDURE — 99212 OFFICE O/P EST SF 10 MIN: CPT | Mod: PBBFAC | Performed by: OPHTHALMOLOGY

## 2022-02-17 PROCEDURE — 99214 OFFICE O/P EST MOD 30 MIN: CPT | Mod: S$PBB,,, | Performed by: OPHTHALMOLOGY

## 2022-02-17 PROCEDURE — 92083 EXTENDED VISUAL FIELD XM: CPT | Mod: PBBFAC | Performed by: OPHTHALMOLOGY

## 2022-02-17 PROCEDURE — 92133 POSTERIOR SEGMENT OCT OPTIC NERVE(OCULAR COHERENCE TOMOGRAPHY) - OU - BOTH EYES: ICD-10-PCS | Mod: 26,S$PBB,, | Performed by: OPHTHALMOLOGY

## 2022-02-17 NOTE — PROGRESS NOTES
SUBJECTIVE  Derrick Cruz Jr. is 76 y.o. male  Uncorrected distance visual acuity was 20/20 -1 in the right eye and 20/20 -1 in the left eye.   Chief Complaint   Patient presents with    Glaucoma     Pt here for 3m IOP HVF GOCT chk. No pain or discomfort. VA stable. 100% compliant with gtts.           HPI     Glaucoma      Additional comments: Pt here for 3m IOP HVF GOCT chk. No pain or   discomfort. VA stable. 100% compliant with gtts.               Comments     1. Mod. COAG (init 22/21) Goal <17  SLT OD 10/10/12 (19-18)  Lumigan ( too expensive )  2. PCIOL / I-STENT OD 6-8-16   PCIOL OS w/iStent 9/30/15  3. Old BRVO OS    Timolol bid ou          Last edited by Yung Schmidt MA on 2/17/2022  3:19 PM. (History)         Assessment /Plan :  1. Primary open angle glaucoma of both eyes, moderate stage Doing well, IOP within acceptable range relative to target IOP and no evidence of progression. Continue current treatment. Reviewed importance of continued compliance with treatment and follow up.      Patient instructed to continue using the following glaucoma medication as follows:  Timolol one drop in each eye every 12 hours    Return to clinic in 3 months  or as needed.  With Dilation       2. Pseudophakia of both eyes  -- Condition stable, no therapeutic change required. Monitoring routinely.

## 2022-05-16 ENCOUNTER — OFFICE VISIT (OUTPATIENT)
Dept: OPHTHALMOLOGY | Facility: CLINIC | Age: 77
End: 2022-05-16
Payer: MEDICARE

## 2022-05-16 DIAGNOSIS — H34.8322 STABLE BRANCH RETINAL VEIN OCCLUSION OF LEFT EYE: ICD-10-CM

## 2022-05-16 DIAGNOSIS — Z96.1 PSEUDOPHAKIA OF BOTH EYES: ICD-10-CM

## 2022-05-16 DIAGNOSIS — H43.811 POSTERIOR VITREOUS DETACHMENT OF RIGHT EYE: ICD-10-CM

## 2022-05-16 DIAGNOSIS — H40.1132 PRIMARY OPEN ANGLE GLAUCOMA OF BOTH EYES, MODERATE STAGE: Primary | ICD-10-CM

## 2022-05-16 PROCEDURE — 99214 PR OFFICE/OUTPT VISIT, EST, LEVL IV, 30-39 MIN: ICD-10-PCS | Mod: S$PBB,,, | Performed by: OPHTHALMOLOGY

## 2022-05-16 PROCEDURE — 99999 PR PBB SHADOW E&M-EST. PATIENT-LVL II: ICD-10-PCS | Mod: PBBFAC,,, | Performed by: OPHTHALMOLOGY

## 2022-05-16 PROCEDURE — 99212 OFFICE O/P EST SF 10 MIN: CPT | Mod: PBBFAC | Performed by: OPHTHALMOLOGY

## 2022-05-16 PROCEDURE — 99999 PR PBB SHADOW E&M-EST. PATIENT-LVL II: CPT | Mod: PBBFAC,,, | Performed by: OPHTHALMOLOGY

## 2022-05-16 PROCEDURE — 99214 OFFICE O/P EST MOD 30 MIN: CPT | Mod: S$PBB,,, | Performed by: OPHTHALMOLOGY

## 2022-05-16 NOTE — PROGRESS NOTES
SUBJECTIVE  Derrick Cruz Jr. is 76 y.o. male  Uncorrected distance visual acuity was 20/20 -1 in the right eye and 20/20 -1 in the left eye.   Chief Complaint   Patient presents with    Glaucoma     Pt reports for 3m rtc for dil. Denies any pain or irritation. Va stable. 100% compliant with gtts.           HPI     Glaucoma      Additional comments: Pt reports for 3m rtc for dil. Denies any pain or   irritation. Va stable. 100% compliant with gtts.               Comments     1. Mod. COAG (init 22/21) Goal <17  SLT OD 10/10/12 (19-18)  Lumigan ( too expensive )  2. PCIOL / I-STENT OD 6-8-16   PCIOL OS w/iStent 9/30/15  3. Old BRVO OS    Timolol bid ou            Last edited by Reynaldo Tran on 5/16/2022  9:05 AM. (History)         Assessment /Plan :  1. Primary open angle glaucoma of both eyes, moderate stage Doing well, IOP within acceptable range relative to target IOP and no evidence of progression. Continue current treatment. Reviewed importance of continued compliance with treatment and follow up.      Patient instructed to continue using the following glaucoma medication as follows:  Timolol one drop in each eye every 12 hours    Return to clinic in 4 months  or as needed.  With IOP Check and GOCT     2. Pseudophakia of both eyes  -- Condition stable, no therapeutic change required. Monitoring routinely.   3. Stable branch retinal vein occlusion of left eye  -- Condition stable, no therapeutic change required. Monitoring routinely.   4. Posterior vitreous detachment of right eye

## 2022-06-29 ENCOUNTER — TELEPHONE (OUTPATIENT)
Dept: OPHTHALMOLOGY | Facility: CLINIC | Age: 77
End: 2022-06-29
Payer: MEDICARE

## 2022-06-29 NOTE — TELEPHONE ENCOUNTER
----- Message from Ducle Teixeira sent at 6/29/2022  2:28 PM CDT -----  Contact: Derrick Abdalla is requesting a callback from the provider in regards to patient has a question about hs eyes.    Please call Derrick at [phone] (home)

## 2022-09-29 ENCOUNTER — OFFICE VISIT (OUTPATIENT)
Dept: OPHTHALMOLOGY | Facility: CLINIC | Age: 77
End: 2022-09-29
Payer: MEDICARE

## 2022-09-29 DIAGNOSIS — H40.1132 PRIMARY OPEN ANGLE GLAUCOMA OF BOTH EYES, MODERATE STAGE: Primary | ICD-10-CM

## 2022-09-29 DIAGNOSIS — Z96.1 PSEUDOPHAKIA OF BOTH EYES: ICD-10-CM

## 2022-09-29 PROCEDURE — 99999 PR PBB SHADOW E&M-EST. PATIENT-LVL II: CPT | Mod: PBBFAC,,, | Performed by: OPHTHALMOLOGY

## 2022-09-29 PROCEDURE — 99214 OFFICE O/P EST MOD 30 MIN: CPT | Mod: S$PBB,,, | Performed by: OPHTHALMOLOGY

## 2022-09-29 PROCEDURE — 92133 POSTERIOR SEGMENT OCT OPTIC NERVE(OCULAR COHERENCE TOMOGRAPHY) - OU - BOTH EYES: ICD-10-PCS | Mod: 26,S$PBB,, | Performed by: OPHTHALMOLOGY

## 2022-09-29 PROCEDURE — 99212 OFFICE O/P EST SF 10 MIN: CPT | Mod: PBBFAC | Performed by: OPHTHALMOLOGY

## 2022-09-29 PROCEDURE — 99999 PR PBB SHADOW E&M-EST. PATIENT-LVL II: ICD-10-PCS | Mod: PBBFAC,,, | Performed by: OPHTHALMOLOGY

## 2022-09-29 PROCEDURE — 92133 CPTRZD OPH DX IMG PST SGM ON: CPT | Mod: PBBFAC | Performed by: OPHTHALMOLOGY

## 2022-09-29 PROCEDURE — 99214 PR OFFICE/OUTPT VISIT, EST, LEVL IV, 30-39 MIN: ICD-10-PCS | Mod: S$PBB,,, | Performed by: OPHTHALMOLOGY

## 2022-09-29 NOTE — PROGRESS NOTES
SUBJECTIVE  Derrick Cruz Jr. is 76 y.o. male  Uncorrected distance visual acuity was 20/20 in the right eye and 20/20 -1 in the left eye.   Chief Complaint   Patient presents with    Glaucoma     Pt reports for 3m IOP and GOCT. Denies any pain or irritation. Va stable. 100% compliant with gtts.           HPI     Glaucoma     Additional comments: Pt reports for 3m IOP and GOCT. Denies any pain or   irritation. Va stable. 100% compliant with gtts.            Comments    1. Mod. COAG (init 22/21) Goal <17  SLT OD 10/10/12 (19-18)  Lumigan ( too expensive )  2. PCIOL / I-STENT OD 6-8-16   PCIOL OS w/iStent 9/30/15  3. Old BRVO OS    Timolol bid ou            Last edited by Reynaldo Tran on 9/29/2022  1:06 PM.         Assessment /Plan :  1. Primary open angle glaucoma of both eyes, moderate stage Doing well, intraocular pressure (IOP) within acceptable range relative to target IOP and no evidence of progression. Continue current treatment. Reviewed importance of continued compliance with treatment and follow up.      Patient instructed to continue using the following glaucoma medication as follows:  Timolol one drop in each eye every 12 hours    Return to clinic in 4 months  or as needed.  With IOP Check     2. Pseudophakia of both eyes  -- Condition stable, no therapeutic change required. Monitoring routinely.

## 2023-01-30 ENCOUNTER — OFFICE VISIT (OUTPATIENT)
Dept: OPHTHALMOLOGY | Facility: CLINIC | Age: 78
End: 2023-01-30
Payer: MEDICARE

## 2023-01-30 DIAGNOSIS — Z96.1 PSEUDOPHAKIA OF BOTH EYES: ICD-10-CM

## 2023-01-30 DIAGNOSIS — H40.1132 PRIMARY OPEN ANGLE GLAUCOMA OF BOTH EYES, MODERATE STAGE: Primary | ICD-10-CM

## 2023-01-30 PROCEDURE — 99213 OFFICE O/P EST LOW 20 MIN: CPT | Mod: PBBFAC | Performed by: OPHTHALMOLOGY

## 2023-01-30 PROCEDURE — 99999 PR PBB SHADOW E&M-EST. PATIENT-LVL III: CPT | Mod: PBBFAC,,, | Performed by: OPHTHALMOLOGY

## 2023-01-30 PROCEDURE — 99999 PR PBB SHADOW E&M-EST. PATIENT-LVL III: ICD-10-PCS | Mod: PBBFAC,,, | Performed by: OPHTHALMOLOGY

## 2023-01-30 PROCEDURE — 99214 OFFICE O/P EST MOD 30 MIN: CPT | Mod: S$PBB,,, | Performed by: OPHTHALMOLOGY

## 2023-01-30 PROCEDURE — 99214 PR OFFICE/OUTPT VISIT, EST, LEVL IV, 30-39 MIN: ICD-10-PCS | Mod: S$PBB,,, | Performed by: OPHTHALMOLOGY

## 2023-01-30 RX ORDER — KETOROLAC TROMETHAMINE 5 MG/ML
SOLUTION OPHTHALMIC
Qty: 5 ML | Refills: 0 | Status: SHIPPED | OUTPATIENT
Start: 2023-01-30

## 2023-01-30 NOTE — PROGRESS NOTES
SUBJECTIVE  Derrick Cruz Jr. is 77 y.o. male  Uncorrected distance visual acuity was 20/20 in the right eye and 20/20 in the left eye.   Chief Complaint   Patient presents with    Glaucoma     Pt reports for 4m IOP check. Denies any pain or irritation. Va stable. 100% compliant with gtts.           HPI     Glaucoma     Additional comments: Pt reports for 4m IOP check. Denies any pain or   irritation. Va stable. 100% compliant with gtts.            Comments    1. Mod. COAG (init 22/21) Goal <17  SLT OD 10/10/12 (19-18)  Lumigan ( too expensive )  2. PCIOL / I-STENT OD 6-8-16   PCIOL OS w/iStent 9/30/15  3. Old BRVO OS    Timolol bid ou            Last edited by Reynaldo Tran on 1/30/2023  1:58 PM.         Assessment /Plan :  1. Primary open angle glaucoma of both eyes, moderate stage     Doing well, intraocular pressure (IOP) OD within acceptable range relative to target IOP and no evidence of progression. Continue current treatment. Reviewed importance of continued compliance with treatment and follow up.    Intraocular pressure (IOP) OS not within acceptable range relative to target IOP with risk of irreversible visual loss. Better IOP control is recommended. Treatment options may include change or additional medications, Selective Laser Trabeculoplasty (SLT laser), and/or incisional glaucoma surgery. Reviewed importance of continued compliance with treatment and follow up.     Patient chooses schedule SLT  OS      Patient instructed to continue using the following glaucoma medication as follows:  Timolol one drop in each eye every 12 hours     2. Pseudophakia of both eyes  -- Condition stable, no therapeutic change required. Monitoring routinely.       Return for the SLT OS

## 2023-02-08 ENCOUNTER — OUTSIDE PLACE OF SERVICE (OUTPATIENT)
Dept: OPHTHALMOLOGY | Facility: CLINIC | Age: 78
End: 2023-02-08
Payer: MEDICARE

## 2023-02-08 PROCEDURE — 65855 PR TRABECULOPLASTY LASER SURGERY: ICD-10-PCS | Mod: LT,,, | Performed by: OPHTHALMOLOGY

## 2023-02-08 PROCEDURE — 65855 TRABECULOPLASTY LASER SURG: CPT | Mod: LT,,, | Performed by: OPHTHALMOLOGY

## 2023-03-09 ENCOUNTER — OFFICE VISIT (OUTPATIENT)
Dept: OPHTHALMOLOGY | Facility: CLINIC | Age: 78
End: 2023-03-09
Payer: MEDICARE

## 2023-03-09 DIAGNOSIS — H40.1132 PRIMARY OPEN ANGLE GLAUCOMA OF BOTH EYES, MODERATE STAGE: Primary | ICD-10-CM

## 2023-03-09 PROCEDURE — 99999 PR PBB SHADOW E&M-EST. PATIENT-LVL I: ICD-10-PCS | Mod: PBBFAC,,, | Performed by: OPHTHALMOLOGY

## 2023-03-09 PROCEDURE — 99214 PR OFFICE/OUTPT VISIT, EST, LEVL IV, 30-39 MIN: ICD-10-PCS | Mod: S$PBB,,, | Performed by: OPHTHALMOLOGY

## 2023-03-09 PROCEDURE — 99211 OFF/OP EST MAY X REQ PHY/QHP: CPT | Mod: PBBFAC | Performed by: OPHTHALMOLOGY

## 2023-03-09 PROCEDURE — 99214 OFFICE O/P EST MOD 30 MIN: CPT | Mod: S$PBB,,, | Performed by: OPHTHALMOLOGY

## 2023-03-09 PROCEDURE — 99999 PR PBB SHADOW E&M-EST. PATIENT-LVL I: CPT | Mod: PBBFAC,,, | Performed by: OPHTHALMOLOGY

## 2023-03-09 NOTE — PROGRESS NOTES
SUBJECTIVE  Derrick Cruz Jr. is 77 y.o. male  Uncorrected distance visual acuity was 20/20 in the right eye and 20/20 in the left eye.   Chief Complaint   Patient presents with    Post-op Evaluation     SLT OS 02/08/23          HPI     Post-op Evaluation     Additional comments: SLT OS 02/08/23           Comments    States that his vision is stable and that he has been compliant with gtts   as directed. Denies any pain or discomfort at this time.      1. Mod. COAG (init 22/21) Goal <17  SLT OD 10/10/12 (19-18)  SLT OS 2/8/23 (18-)  Lumigan ( too expensive )  2. PCIOL / I-STENT OD 6-8-16   PCIOL OS w/iStent 9/30/15  3. Old BRVO OS    Timolol bid ou            Last edited by Keiko Eddy on 3/9/2023  1:26 PM.         Assessment /Plan :  1. Primary open angle glaucoma of both eyes, moderate stage 5 Weeks S/P SLT OS well tolerated.    excellent response to SLT and continue current regimen.    Return to clinic in 4 months  or as needed.  With GOCT, 24-2 HVF, and Dilation

## 2023-07-10 ENCOUNTER — OFFICE VISIT (OUTPATIENT)
Dept: OPHTHALMOLOGY | Facility: CLINIC | Age: 78
End: 2023-07-10
Payer: COMMERCIAL

## 2023-07-10 DIAGNOSIS — Z96.1 PSEUDOPHAKIA OF BOTH EYES: ICD-10-CM

## 2023-07-10 DIAGNOSIS — H40.1132 PRIMARY OPEN ANGLE GLAUCOMA OF BOTH EYES, MODERATE STAGE: Primary | ICD-10-CM

## 2023-07-10 DIAGNOSIS — H34.8322 STABLE BRANCH RETINAL VEIN OCCLUSION OF LEFT EYE: ICD-10-CM

## 2023-07-10 PROCEDURE — 92133 CPTRZD OPH DX IMG PST SGM ON: CPT | Mod: PBBFAC | Performed by: OPHTHALMOLOGY

## 2023-07-10 PROCEDURE — 99999 PR PBB SHADOW E&M-EST. PATIENT-LVL II: ICD-10-PCS | Mod: PBBFAC,,, | Performed by: OPHTHALMOLOGY

## 2023-07-10 PROCEDURE — 99999 PR PBB SHADOW E&M-EST. PATIENT-LVL II: CPT | Mod: PBBFAC,,, | Performed by: OPHTHALMOLOGY

## 2023-07-10 PROCEDURE — 92133 POSTERIOR SEGMENT OCT OPTIC NERVE(OCULAR COHERENCE TOMOGRAPHY) - OU - BOTH EYES: ICD-10-PCS | Mod: S$GLB,,, | Performed by: OPHTHALMOLOGY

## 2023-07-10 PROCEDURE — 92014 PR EYE EXAM, EST PATIENT,COMPREHESV: ICD-10-PCS | Mod: S$GLB,,, | Performed by: OPHTHALMOLOGY

## 2023-07-10 PROCEDURE — 99212 OFFICE O/P EST SF 10 MIN: CPT | Mod: PBBFAC | Performed by: OPHTHALMOLOGY

## 2023-07-10 PROCEDURE — 92014 COMPRE OPH EXAM EST PT 1/>: CPT | Mod: ICN,S$GLB,, | Performed by: OPHTHALMOLOGY

## 2023-07-10 NOTE — PROGRESS NOTES
SUBJECTIVE  Derrick Cruz Jr. is 77 y.o. male  Uncorrected distance visual acuity was 20/20 in the right eye and 20/20 in the left eye.   Chief Complaint   Patient presents with    Glaucoma     4 month GOCT           HPI     Glaucoma     Additional comments: 4 month GOCT            Comments    States that his vision is stable and that he has been compliant with gtts   as directed.     1. Mod. COAG (init 22/21) Goal <17  SLT OD 10/10/12 (19-18)  SLT OS 2/8/23 (18-14.5)  Lumigan ( too expensive )  2. PCIOL / I-STENT OD 6-8-16   PCIOL OS w/iStent 9/30/15  3. Old BRVO OS    Timolol bid ou            Last edited by Keiko Eddy on 7/10/2023  9:24 AM.         Assessment /Plan :  1. Primary open angle glaucoma of both eyes, moderate stage Doing well, intraocular pressure (IOP) within acceptable range relative to target IOP and no evidence of progression. Continue current treatment. Reviewed importance of continued compliance with treatment and follow up.      Patient instructed to continue using the following glaucoma medication as follows:  Timolol one drop in each eye every 12 hours    Return to clinic in 4 months  or as needed.  With IOP Check and HVF 24-2     2. Pseudophakia of both eyes  -- Condition stable, no therapeutic change required. Monitoring routinely.     3. Stable branch retinal vein occlusion of left eye  -- Condition stable, no therapeutic change required. Monitoring routinely.

## 2023-08-15 ENCOUNTER — TELEPHONE (OUTPATIENT)
Dept: OPHTHALMOLOGY | Facility: CLINIC | Age: 78
End: 2023-08-15
Payer: COMMERCIAL

## 2023-08-15 NOTE — TELEPHONE ENCOUNTER
Left vm for pt to call back to discuss refilling his Kenalog ointment and if Dr. Michele should be the prescribing doctor or if he would need his PCP to send in a refill.     ----- Message from Carrie Quinones sent at 8/15/2023  3:43 PM CDT -----  Contact: Derrick  Type:  RX Refill Request    Who Called: Derrick   Refill or New Rx:refill  RX Name and Strength:triamcinolone acetonide 0.1% (KENALOG) 0.1 % ointment  How is the patient currently taking it? (ex. 1XDay):1xday  Is this a 30 day or 90 day RX:30day  Preferred Pharmacy with phone number:  [id number] - [place], [place] - [address] [facility]  [address]  [place] [address]  Phone: [phone] Fax: [phone]  Local or Mail Order:local  Ordering Provider:    Would the patient rather a call back or a response via [facility]? Call back   Best Call Back Number:[phone]  Additional Information:   Thanks   Am